# Patient Record
Sex: FEMALE | Race: WHITE | NOT HISPANIC OR LATINO | URBAN - METROPOLITAN AREA
[De-identification: names, ages, dates, MRNs, and addresses within clinical notes are randomized per-mention and may not be internally consistent; named-entity substitution may affect disease eponyms.]

---

## 2017-04-21 ENCOUNTER — IMPORTED ENCOUNTER (OUTPATIENT)
Dept: URBAN - METROPOLITAN AREA CLINIC 50 | Facility: CLINIC | Age: 47
End: 2017-04-21

## 2017-08-07 ENCOUNTER — IMPORTED ENCOUNTER (OUTPATIENT)
Dept: URBAN - METROPOLITAN AREA CLINIC 50 | Facility: CLINIC | Age: 47
End: 2017-08-07

## 2019-09-23 ENCOUNTER — IMPORTED ENCOUNTER (OUTPATIENT)
Dept: URBAN - METROPOLITAN AREA CLINIC 50 | Facility: CLINIC | Age: 49
End: 2019-09-23

## 2019-09-24 ENCOUNTER — IMPORTED ENCOUNTER (OUTPATIENT)
Dept: URBAN - METROPOLITAN AREA CLINIC 50 | Facility: CLINIC | Age: 49
End: 2019-09-24

## 2019-09-25 ENCOUNTER — IMPORTED ENCOUNTER (OUTPATIENT)
Dept: URBAN - METROPOLITAN AREA CLINIC 50 | Facility: CLINIC | Age: 49
End: 2019-09-25

## 2019-11-04 ENCOUNTER — IMPORTED ENCOUNTER (OUTPATIENT)
Dept: URBAN - METROPOLITAN AREA CLINIC 50 | Facility: CLINIC | Age: 49
End: 2019-11-04

## 2020-04-13 NOTE — PATIENT DISCUSSION
"""Call if vision decreases or RD warning signs increases/RD warnings given.  No signs of right eye ""
"""Continue Refresh both eyes as needed . "" ""Continue Warm compresses right eye twice a day
"""Informed patient that their capsular opacification is visually significant and meets the minimum ""
Detail Level: Detailed

## 2021-04-17 ASSESSMENT — VISUAL ACUITY
OD_SC: 20/40
OS_OTHER: >20/400.
OD_SC: 20/30
OD_OTHER: >20/400.
OS_BAT: >20/400
OD_BAT: >20/400
OS_SC: 20/200+2
OS_OTHER: >20/400.
OD_OTHER: >20/400.
OD_SC: 20/100-1
OS_SC: 20/100
OD_BAT: >20/400
OS_BAT: >20/400
OS_SC: 20/30-2
OD_PH: 20/80
OS_PH: 20/80

## 2021-04-17 ASSESSMENT — TONOMETRY
OS_IOP_MMHG: 16
OD_IOP_MMHG: 15
OS_IOP_MMHG: 12
OS_IOP_MMHG: 16
OD_IOP_MMHG: 16
OD_IOP_MMHG: 12